# Patient Record
Sex: FEMALE | Race: WHITE | NOT HISPANIC OR LATINO
[De-identification: names, ages, dates, MRNs, and addresses within clinical notes are randomized per-mention and may not be internally consistent; named-entity substitution may affect disease eponyms.]

---

## 2017-02-15 ENCOUNTER — APPOINTMENT (OUTPATIENT)
Dept: OTOLARYNGOLOGY | Facility: CLINIC | Age: 47
End: 2017-02-15

## 2017-02-15 DIAGNOSIS — S02.2XXA FRACTURE OF NASAL BONES, INITIAL ENCOUNTER FOR CLOSED FRACTURE: ICD-10-CM

## 2017-02-15 PROBLEM — Z00.00 ENCOUNTER FOR PREVENTIVE HEALTH EXAMINATION: Status: ACTIVE | Noted: 2017-02-15

## 2019-09-28 ENCOUNTER — EMERGENCY (EMERGENCY)
Facility: HOSPITAL | Age: 49
LOS: 1 days | Discharge: HOME | End: 2019-09-28
Attending: EMERGENCY MEDICINE | Admitting: EMERGENCY MEDICINE
Payer: COMMERCIAL

## 2019-09-28 VITALS
SYSTOLIC BLOOD PRESSURE: 172 MMHG | DIASTOLIC BLOOD PRESSURE: 110 MMHG | HEART RATE: 84 BPM | OXYGEN SATURATION: 99 % | TEMPERATURE: 98 F | RESPIRATION RATE: 17 BRPM

## 2019-09-28 DIAGNOSIS — Z91.013 ALLERGY TO SEAFOOD: ICD-10-CM

## 2019-09-28 DIAGNOSIS — R07.0 PAIN IN THROAT: ICD-10-CM

## 2019-09-28 DIAGNOSIS — T78.1XXA OTHER ADVERSE FOOD REACTIONS, NOT ELSEWHERE CLASSIFIED, INITIAL ENCOUNTER: ICD-10-CM

## 2019-09-28 DIAGNOSIS — R21 RASH AND OTHER NONSPECIFIC SKIN ERUPTION: ICD-10-CM
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
## 2019-09-28 DIAGNOSIS — T78.09XA ANAPHYLACTIC REACTION DUE TO OTHER FOOD PRODUCTS, INITIAL ENCOUNTER: ICD-10-CM
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
## 2019-09-28 PROCEDURE — 96372 THER/PROPH/DIAG INJ SC/IM: CPT | Mod: XU

## 2019-09-28 PROCEDURE — 96375 TX/PRO/DX INJ NEW DRUG ADDON: CPT

## 2019-09-28 PROCEDURE — 99285 EMERGENCY DEPT VISIT HI MDM: CPT

## 2019-09-28 PROCEDURE — 99284 EMERGENCY DEPT VISIT MOD MDM: CPT | Mod: 25

## 2019-09-28 PROCEDURE — 96374 THER/PROPH/DIAG INJ IV PUSH: CPT

## 2019-09-28 RX ORDER — EPINEPHRINE 0.3 MG/.3ML
0.3 INJECTION INTRAMUSCULAR; SUBCUTANEOUS ONCE
Refills: 0 | Status: COMPLETED | OUTPATIENT
Start: 2019-09-28 | End: 2019-09-28

## 2019-09-28 RX ORDER — DIPHENHYDRAMINE HCL 50 MG
25 CAPSULE ORAL ONCE
Refills: 0 | Status: COMPLETED | OUTPATIENT
Start: 2019-09-28 | End: 2019-09-28

## 2019-09-28 RX ORDER — OXYMETAZOLINE HYDROCHLORIDE 0.5 MG/ML
1 SPRAY NASAL ONCE
Refills: 0 | Status: COMPLETED | OUTPATIENT
Start: 2019-09-28 | End: 2019-09-28

## 2019-09-28 RX ORDER — FAMOTIDINE 10 MG/ML
20 INJECTION INTRAVENOUS ONCE
Refills: 0 | Status: COMPLETED | OUTPATIENT
Start: 2019-09-28 | End: 2019-09-28

## 2019-09-28 RX ADMIN — Medication 125 MILLIGRAM(S): at 22:25

## 2019-09-28 RX ADMIN — OXYMETAZOLINE HYDROCHLORIDE 1 SPRAY(S): 0.5 SPRAY NASAL at 23:45

## 2019-09-28 RX ADMIN — Medication 25 MILLIGRAM(S): at 22:25

## 2019-09-28 RX ADMIN — FAMOTIDINE 20 MILLIGRAM(S): 10 INJECTION INTRAVENOUS at 22:30

## 2019-09-28 RX ADMIN — EPINEPHRINE 0.3 MILLIGRAM(S): 0.3 INJECTION INTRAMUSCULAR; SUBCUTANEOUS at 22:25

## 2019-09-28 NOTE — ED PROVIDER NOTE - CONSTITUTIONAL, MLM
normal... diffuse hives- swelling of eyelids- redness to face, awake, alert, oriented to person, place, time/situation and in no apparent distress.

## 2019-09-28 NOTE — CONSULT NOTE ADULT - SUBJECTIVE AND OBJECTIVE BOX
49F with hx of multiple allergies and prior hx of systemic rxn presents to ED with anaphylaxis. Symptoms began after eating pumpkin seeds approx 2-3 hours prior to evaluation. She then experienced throat tightness, nasal congestion, voice changes and pruritic hives over face and hands. Vomited x 1. Took  Has had similar reaction to several foods in the past (fish, peanuts), but never this severe. No dyspnea or stridor. Took benadryl at home without significant improvement.     In the ED, received solumedrol, H1/H2 blocker and epinephrine x1 with subsequent improvement in symptoms.    Vital Signs Last 24 Hrs  T(C): 36.7 (28 Sep 2019 22:07), Max: 36.7 (28 Sep 2019 22:07)  T(F): 98 (28 Sep 2019 22:07), Max: 98 (28 Sep 2019 22:07)  HR: 84 (28 Sep 2019 22:56) (84 - 84)  BP: 162/88 (28 Sep 2019 22:56) (162/88 - 172/110)  BP(mean): --  RR: 17 (28 Sep 2019 22:56) (17 - 17)  SpO2: 100% (28 Sep 2019 22:56) (99% - 100%)    Exam  Gen - awake, alert in NAD, speaking in full sentences, voice strong and clear  Head - NCAT  Face - hives/rash present   Nose - congested anteriorly  Resp - breathing comfortably on RA. No tachypnea, stridor, retractions    Fiberoptic laryngoscopy - Nasal passage clear. NP clear. No edema of epiglottis, vallecula, BOT, piriforms, arytenoids, AE folds, FVCs, TVCs. Mild erythema of postcricoid mucosa c/w LPR. VCs mobile. Glottis widely patent.

## 2019-09-28 NOTE — CONSULT NOTE ADULT - ASSESSMENT
49F with anaphylaxis, improving s/p steroids, H1/H2 blocker, epi. No evidence of airway edema on fiberoptic exam.  -c/w medical management (steroids, H1/H2 blocker +/- epi if needed) per ED  -consider outpatient allergy follow up given hx of multiple triggers   -Page ENT with further questions/concerns or for changes in respiratory status     -seen and d/w attending Dr Rose

## 2019-09-28 NOTE — ED PROVIDER NOTE - NSFOLLOWUPINSTRUCTIONS_ED_ALL_ED_FT
Anaphylactic Reaction, Adult  An anaphylactic reaction (anaphylaxis) is a sudden, severe allergic reaction by the body's disease-fighting system (immune system). Anaphylaxis can be life-threatening. This condition requires immediate medical attention and sometimes hospitalization.    What are the causes?  This condition is caused by exposure to a substance that you are allergic to (allergen). In response to this exposure, the body releases proteins (antibodies) and other compounds, such as histamine, into the bloodstream. This causes swelling in certain tissues and loss of blood pressure to important areas, such as the heart and lungs.    Common allergens that can cause anaphylaxis include:  Foods, especially peanuts, wheat, shellfish, milk, and eggs.  Medicines.  Insect bites or stings.  Blood or parts of blood, including plasma, immunoglobulins, or serum.  Chemicals, such as dyes, latex, and contrast material that is used for medical tests.  What increases the risk?  This condition is more likely to occur in people who:  Have allergies.  Have had anaphylaxis before.  Have a family history of anaphylaxis.  Have certain medical conditions, including asthma and eczema.  What are the signs or symptoms?  Symptoms of anaphylaxis may include:  Feeling warm in the face (flushed). This may include redness.  Itchy, red, swollen areas of skin (hives).  Swelling of the eyes, lips, face, mouth, tongue, or throat.  Difficulty breathing, speaking, or swallowing.  Noisy breathing (wheezing).  Dizziness or light-headedness.  Fainting.  Pain or cramping in the abdomen.  Vomiting.  Diarrhea.  How is this diagnosed?  This condition is diagnosed based on a physical exam and any recent history of exposure to allergens.    How is this treated?  [facility] care provider may teach you:  How to use an anaphylaxis kit.  How to give yourself an epinephrine injection using what is commonly called an auto-injector "pen" (pre-filled automatic epinephrine injection device).  If you think that you are having an anaphylactic reaction, use your auto-injector pen or anaphylaxis kit. If you use a kit or pen, you must still get emergency medical treatment.    Treatment in the hospital may include:  Medicines that help:  To tighten your blood vessels (epinephrine).  To relieve itching and hives (antihistamines).  To reduce swelling (corticosteroids).  Oxygen therapy to help you breathe.  IV fluids to keep you hydrated.  Follow these instructions at home:  Safety     Always keep an auto-injector pen or an anaphylaxis kit near you. These can be lifesaving if you have a severe anaphylactic reaction. Use your auto-injector pen or anaphylaxis kit as told by your health care provider.  Do not drive after an anaphylactic reaction until your health care provider approves.  Make sure that you, the members of your household, and your employer know:  How to use an anaphylaxis kit.  How to use an auto-injector pen to give you an epinephrine injection.  Replace your epinephrine immediately after you use your auto-injector pen, in case you have another reaction.  Wear a medical alert bracelet or necklace that states your allergy, if told by your health care provider.  Learn the signs of anaphylaxis so that you can recognize and treat it right away.  Work with your health care providers to make an anaphylaxis plan. Preparation is important.  General instructions     Take over-the-counter and prescription medicines only as told by your health care provider.  If you have hives or a rash:  Use an over-the-counter antihistamine as told by your health care provider.  Apply cold, wet cloths (cold compresses) to your skin or take baths or showers in cool water. Avoid hot water.  Tell any health care providers who care for you that you have an allergy.  Keep all follow-up visits as told by your health care provider. This is important.  How is this prevented?  Avoid allergens that have caused an anaphylactic reaction for you before.  When you are at a restaurant, tell your  that you have an allergy. If you are not sure if a menu item contains an ingredient that you are allergic to, ask your .  Get help right away if:  You develop symptoms of an allergic reaction. You may notice them soon after you are exposed to a substance. Symptoms may include:  Flushed skin.  Hives.  Swelling of the eyes, lips, face, mouth, tongue, or throat.  Difficulty breathing, speaking, or swallowing.  Wheezing.  Dizziness or light-headedness.  Fainting.  Pain or cramping in the abdomen.  Vomiting.  Diarrhea.  You needed to use epinephrine. You need more medical care even if the medicine seems to be helping. You may need more doses of epinephrine. This is important because anaphylaxis may happen again within 72 hours (rebound anaphylaxis).  These symptoms may represent a serious problem that is an emergency. Do not wait to see if the symptoms will go away. Use your auto-injector pen or anaphylaxis kit as you have been instructed, and get medical help right away. Call your local emergency services (911 in the [place] Do not drive yourself to the hospital.     Summary  An anaphylactic reaction (anaphylaxis) is a sudden, severe allergic reaction by the body's disease-fighting system (immune system).  This condition can be life-threatening. If you have an anaphylactic reaction, get medical help right away.  Your health care provider may teach you how to use an anaphylaxis kit and how to use an auto-injector "pen" (pre-filled automatic epinephrine injection device) to give yourself a shot.  Always keep an auto-injector pen or your anaphylaxis kit with you. These could save your life. Use them as told by your health care provider.  If you use epinephrine, you must still get emergency medical treatment, even if the medicine seems to be helping.  This information is not intended to replace advice given to you by your health care provider. Make sure you discuss any questions you have with your health care provider.    Document Released: 12/18/2006 Document Revised: 11/27/2018 Document Reviewed: 08/10/2018  [employer] Interactive Patient Education © 2019 [employer] Can take benadryl 25-50mg every 6hrs as needed for rash/itching - MAY CAUSE LIGHTHEADEDNESS.  Can take tylenol 650mg every 6hrs as needed for mild pain.  Take steroids as prescribed.   Inject epi-pen as prescribed for worsening breathing AND/OR facial swelling or worsening voice changes. AND RETURN to ER.  Return to ER for persistent fever/vomit, uncontrolled pain, worsening breathing.   Follow up with primary doctor within 1-2 days.  Follow up with allergist.     Anaphylactic Reaction, Adult  An anaphylactic reaction (anaphylaxis) is a sudden, severe allergic reaction by the body's disease-fighting system (immune system). Anaphylaxis can be life-threatening. This condition requires immediate medical attention and sometimes hospitalization.    What are the causes?  This condition is caused by exposure to a substance that you are allergic to (allergen). In response to this exposure, the body releases proteins (antibodies) and other compounds, such as histamine, into the bloodstream. This causes swelling in certain tissues and loss of blood pressure to important areas, such as the heart and lungs.    Common allergens that can cause anaphylaxis include:  Foods, especially peanuts, wheat, shellfish, milk, and eggs.  Medicines.  Insect bites or stings.  Blood or parts of blood, including plasma, immunoglobulins, or serum.  Chemicals, such as dyes, latex, and contrast material that is used for medical tests.  What increases the risk?  This condition is more likely to occur in people who:  Have allergies.  Have had anaphylaxis before.  Have a family history of anaphylaxis.  Have certain medical conditions, including asthma and eczema.  What are the signs or symptoms?  Symptoms of anaphylaxis may include:  Feeling warm in the face (flushed). This may include redness.  Itchy, red, swollen areas of skin (hives).  Swelling of the eyes, lips, face, mouth, tongue, or throat.  Difficulty breathing, speaking, or swallowing.  Noisy breathing (wheezing).  Dizziness or light-headedness.  Fainting.  Pain or cramping in the abdomen.  Vomiting.  Diarrhea.  How is this diagnosed?  This condition is diagnosed based on a physical exam and any recent history of exposure to allergens.    How is this treated?  [place] care provider may teach you:  How to use an anaphylaxis kit.  How to give yourself an epinephrine injection using what is commonly called an auto-injector "pen" (pre-filled automatic epinephrine injection device).  If you think that you are having an anaphylactic reaction, use your auto-injector pen or anaphylaxis kit. If you use a kit or pen, you must still get emergency medical treatment.    Treatment in the hospital may include:  Medicines that help:  To tighten your blood vessels (epinephrine).  To relieve itching and hives (antihistamines).  To reduce swelling (corticosteroids).  Oxygen therapy to help you breathe.  IV fluids to keep you hydrated.  Follow these instructions at home:  Safety     Always keep an auto-injector pen or an anaphylaxis kit near you. These can be lifesaving if you have a severe anaphylactic reaction. Use your auto-injector pen or anaphylaxis kit as told by your health care provider.  Do not drive after an anaphylactic reaction until your health care provider approves.  Make sure that you, the members of your household, and your employer know:  How to use an anaphylaxis kit.  How to use an auto-injector pen to give you an epinephrine injection.  Replace your epinephrine immediately after you use your auto-injector pen, in case you have another reaction.  Wear a medical alert bracelet or necklace that states your allergy, if told by your health care provider.  Learn the signs of anaphylaxis so that you can recognize and treat it right away.  Work with your health care providers to make an anaphylaxis plan. Preparation is important.  General instructions     Take over-the-counter and prescription medicines only as told by your health care provider.  If you have hives or a rash:  Use an over-the-counter antihistamine as told by your health care provider.  Apply cold, wet cloths (cold compresses) to your skin or take baths or showers in cool water. Avoid hot water.  Tell any health care providers who care for you that you have an allergy.  Keep all follow-up visits as told by your health care provider. This is important.  How is this prevented?  Avoid allergens that have caused an anaphylactic reaction for you before.  When you are at a restaurant, tell your  that you have an allergy. If you are not sure if a menu item contains an ingredient that you are allergic to, ask your .  Get help right away if:  You develop symptoms of an allergic reaction. You may notice them soon after you are exposed to a substance. Symptoms may include:  Flushed skin.  Hives.  Swelling of the eyes, lips, face, mouth, tongue, or throat.  Difficulty breathing, speaking, or swallowing.  Wheezing.  Dizziness or light-headedness.  Fainting.  Pain or cramping in the abdomen.  Vomiting.  Diarrhea.  You needed to use epinephrine. You need more medical care even if the medicine seems to be helping. You may need more doses of epinephrine. This is important because anaphylaxis may happen again within 72 hours (rebound anaphylaxis).  These symptoms may represent a serious problem that is an emergency. Do not wait to see if the symptoms will go away. Use your auto-injector pen or anaphylaxis kit as you have been instructed, and get medical help right away. Call your local emergency services (911 in the [place] Do not drive yourself to the hospital.     Summary  An anaphylactic reaction (anaphylaxis) is a sudden, severe allergic reaction by the body's disease-fighting system (immune system).  This condition can be life-threatening. If you have an anaphylactic reaction, get medical help right away.  Your health care provider may teach you how to use an anaphylaxis kit and how to use an auto-injector "pen" (pre-filled automatic epinephrine injection device) to give yourself a shot.  Always keep an auto-injector pen or your anaphylaxis kit with you. These could save your life. Use them as told by your health care provider.  If you use epinephrine, you must still get emergency medical treatment, even if the medicine seems to be helping.  This information is not intended to replace advice given to you by your health care provider. Make sure you discuss any questions you have with your health care provider.    Document Released: 12/18/2006 Document Revised: 11/27/2018 Document Reviewed: 08/10/2018  [employer] Interactive Patient Education © 2019 [employer]

## 2019-09-28 NOTE — ED PROVIDER NOTE - OBJECTIVE STATEMENT
49 F w anaphylactic reaction- hx of peanut/fish hives/reaction- at 8 pm ate a burrito- felt sx coming on- congestion, throat tightness, sob- took claritin and 25 mg benadryl- vomited 15 mins later- then took another 25 mg benadryl  now w mild throat tightness, diffuse rash- feeling slight improvement from the 25 mg of benadryl  exac- burrito?  allev- benadryl  mod-severe

## 2019-09-28 NOTE — ED ADULT NURSE NOTE - OBJECTIVE STATEMENT
Patient to the ED with complaint of allergic reaction, ate dinner at restaurant and approx an hr PTA began having allergic reaction, swollen eyes, face, throat, body hives vomiting at home,, took 25 mg of benadryl and claritin at home.

## 2019-09-28 NOTE — ED PROVIDER NOTE - PROGRESS NOTE DETAILS
Frank: Received s/o. Scoped by ENT, no airway issues. Pt feeling much better, no resp distress or airway complaints. Will reassess. [place]: sleeping comfortably, will reassess. [place]: Overall feeling much better. has no throat/airway complaints. still mild b/l periorbital edema but much improved. observed in ED >5hrs w/ only improvement in symptoms, comfortable for dc, outpt pmd f/u.

## 2019-09-28 NOTE — ED PROVIDER NOTE - PATIENT PORTAL LINK FT
You can access the FollowMyHealth Patient Portal offered by [facility] by registering at the following website: [email or web address]. By joining [employer]’s FollowMyHealth portal, you will also be able to view your health information using other applications (apps) compatible with our system.

## 2019-09-28 NOTE — ED PROVIDER NOTE - ENMT, MLM
Airway patent, Nasal mucosa clear. Mouth with normal mucosa. swelling to post pharynx  no stridor, no drooling

## 2019-09-29 VITALS
SYSTOLIC BLOOD PRESSURE: 124 MMHG | HEART RATE: 75 BPM | TEMPERATURE: 98 F | DIASTOLIC BLOOD PRESSURE: 73 MMHG | OXYGEN SATURATION: 97 % | RESPIRATION RATE: 16 BRPM

## 2019-09-29 RX ORDER — EPINEPHRINE 0.3 MG/.3ML
0.3 INJECTION INTRAMUSCULAR; SUBCUTANEOUS
Qty: 1 | Refills: 0
Start: 2019-09-29 | End: 2019-09-29

## 2019-09-29 NOTE — ED ADULT NURSE REASSESSMENT NOTE - NS ED NURSE REASSESS COMMENT FT1
Alert Ox4, breathing regular non labored, speech clear,  no drooling "I feel better, i.m a new woman"

## 2021-01-01 NOTE — ED ADULT NURSE NOTE - NSFALLRSKPASTHIST_ED_ALL_ED
3 mom is currently attempting to wake and feed . Discussed the importance of feeding on demand, but to attempt q4 hours. Per mom, while in the womb  was usually most active during the evening and overnight hours. Encouraged to call for assistance. Mom verbalized understanding. no

## 2022-11-07 RX ORDER — TRIAMCINOLONE ACETONIDE 1 MG/G
0.1 PASTE DENTAL 3 TIMES DAILY
Qty: 5 | Refills: 2 | Status: ACTIVE | COMMUNITY
Start: 2022-11-07 | End: 1900-01-01

## 2025-06-24 NOTE — ED ADULT NURSE NOTE - FINAL NURSING ELECTRONIC SIGNATURE
Detail Level: Detailed Quality 226: Preventive Care And Screening: Tobacco Use: Screening And Cessation Intervention: Patient screened for tobacco use and is an ex/non-smoker 29-Sep-2019 03:40